# Patient Record
Sex: FEMALE | Race: BLACK OR AFRICAN AMERICAN | NOT HISPANIC OR LATINO | Employment: FULL TIME | URBAN - METROPOLITAN AREA
[De-identification: names, ages, dates, MRNs, and addresses within clinical notes are randomized per-mention and may not be internally consistent; named-entity substitution may affect disease eponyms.]

---

## 2018-10-08 PROBLEM — K59.04 CHRONIC IDIOPATHIC CONSTIPATION: Status: ACTIVE | Noted: 2018-10-08

## 2018-11-05 PROBLEM — L30.9 ECZEMA: Status: ACTIVE | Noted: 2018-11-05

## 2019-06-26 PROBLEM — R61 HYPERHIDROSIS: Status: ACTIVE | Noted: 2019-06-26

## 2019-11-04 PROBLEM — K59.04 CHRONIC IDIOPATHIC CONSTIPATION: Chronic | Status: ACTIVE | Noted: 2018-10-08

## 2019-11-04 PROBLEM — L20.84 INTRINSIC ECZEMA: Status: ACTIVE | Noted: 2018-11-05

## 2019-11-25 PROBLEM — R76.8 POSITIVE ANA (ANTINUCLEAR ANTIBODY): Status: ACTIVE | Noted: 2019-11-25

## 2019-11-25 PROBLEM — Z20.828 EBV EXPOSURE: Status: ACTIVE | Noted: 2019-11-25

## 2019-12-10 PROBLEM — R59.0 CERVICAL ADENOPATHY: Status: ACTIVE | Noted: 2019-12-10

## 2020-04-19 PROBLEM — Z20.828 EBV EXPOSURE: Chronic | Status: ACTIVE | Noted: 2019-11-25

## 2020-04-19 PROBLEM — R76.8 POSITIVE ANA (ANTINUCLEAR ANTIBODY): Chronic | Status: ACTIVE | Noted: 2019-11-25

## 2020-10-26 PROBLEM — S13.4XXA WHIPLASH INJURIES, INITIAL ENCOUNTER: Status: ACTIVE | Noted: 2020-10-26

## 2020-12-13 PROBLEM — S13.4XXS WHIPLASH INJURIES, SEQUELA: Status: ACTIVE | Noted: 2020-10-26

## 2021-03-17 PROBLEM — R59.0 CERVICAL ADENOPATHY: Chronic | Status: ACTIVE | Noted: 2019-12-10

## 2021-05-04 ENCOUNTER — PATIENT MESSAGE (OUTPATIENT)
Dept: RESEARCH | Facility: HOSPITAL | Age: 51
End: 2021-05-04

## 2021-06-16 ENCOUNTER — TELEPHONE (OUTPATIENT)
Dept: SURGERY | Facility: CLINIC | Age: 51
End: 2021-06-16

## 2021-06-16 ENCOUNTER — PATIENT MESSAGE (OUTPATIENT)
Dept: SURGERY | Facility: CLINIC | Age: 51
End: 2021-06-16

## 2021-06-23 ENCOUNTER — TELEPHONE (OUTPATIENT)
Dept: SURGERY | Facility: CLINIC | Age: 51
End: 2021-06-23

## 2021-08-17 ENCOUNTER — TELEPHONE (OUTPATIENT)
Dept: SURGERY | Facility: CLINIC | Age: 51
End: 2021-08-17

## 2021-08-19 ENCOUNTER — TELEPHONE (OUTPATIENT)
Dept: SURGERY | Facility: CLINIC | Age: 51
End: 2021-08-19

## 2021-08-19 ENCOUNTER — OFFICE VISIT (OUTPATIENT)
Dept: SURGERY | Facility: CLINIC | Age: 51
End: 2021-08-19
Payer: COMMERCIAL

## 2021-08-19 VITALS
DIASTOLIC BLOOD PRESSURE: 78 MMHG | WEIGHT: 169.75 LBS | SYSTOLIC BLOOD PRESSURE: 122 MMHG | HEART RATE: 94 BPM | BODY MASS INDEX: 28.98 KG/M2 | HEIGHT: 64 IN | OXYGEN SATURATION: 99 %

## 2021-08-19 DIAGNOSIS — N81.6 RECTOCELE: ICD-10-CM

## 2021-08-19 DIAGNOSIS — Z80.0 FAMILY HISTORY OF COLON CANCER: ICD-10-CM

## 2021-08-19 DIAGNOSIS — K59.00 CONSTIPATION, UNSPECIFIED CONSTIPATION TYPE: Primary | ICD-10-CM

## 2021-08-19 PROCEDURE — 46600 DIAGNOSTIC ANOSCOPY SPX: CPT | Mod: S$GLB,,, | Performed by: COLON & RECTAL SURGERY

## 2021-08-19 PROCEDURE — 46600 PR DIAG2STIC A2SCOPY: ICD-10-PCS | Mod: S$GLB,,, | Performed by: COLON & RECTAL SURGERY

## 2021-08-19 PROCEDURE — 99203 PR OFFICE/OUTPT VISIT, NEW, LEVL III, 30-44 MIN: ICD-10-PCS | Mod: 25,S$GLB,, | Performed by: COLON & RECTAL SURGERY

## 2021-08-19 PROCEDURE — 99203 OFFICE O/P NEW LOW 30 MIN: CPT | Mod: 25,S$GLB,, | Performed by: COLON & RECTAL SURGERY

## 2021-08-19 PROCEDURE — 99999 PR PBB SHADOW E&M-EST. PATIENT-LVL IV: CPT | Mod: PBBFAC,,, | Performed by: COLON & RECTAL SURGERY

## 2021-08-19 PROCEDURE — 99999 PR PBB SHADOW E&M-EST. PATIENT-LVL IV: ICD-10-PCS | Mod: PBBFAC,,, | Performed by: COLON & RECTAL SURGERY

## 2021-08-19 RX ORDER — HYDROCODONE BITARTRATE AND ACETAMINOPHEN 10; 325 MG/1; MG/1
TABLET ORAL
COMMUNITY
Start: 2021-07-23 | End: 2021-08-19

## 2021-08-19 RX ORDER — CYCLOBENZAPRINE HCL 10 MG
10 TABLET ORAL 3 TIMES DAILY
COMMUNITY
Start: 2021-04-19 | End: 2021-08-19

## 2021-08-19 RX ORDER — MELOXICAM 15 MG/1
15 TABLET ORAL DAILY
COMMUNITY
Start: 2021-04-19 | End: 2021-08-19

## 2021-08-19 RX ORDER — GABAPENTIN 300 MG/1
300 CAPSULE ORAL 3 TIMES DAILY
COMMUNITY
Start: 2021-04-19 | End: 2021-08-19

## 2021-08-19 RX ORDER — GLYCOPYRROLATE 1 MG/1
TABLET ORAL
COMMUNITY
Start: 2021-04-14 | End: 2021-08-19

## 2021-08-19 RX ORDER — FLUCONAZOLE 150 MG/1
TABLET ORAL
COMMUNITY
Start: 2021-04-13 | End: 2021-08-19

## 2021-08-19 RX ORDER — METFORMIN HYDROCHLORIDE 500 MG/1
500 TABLET, EXTENDED RELEASE ORAL NIGHTLY
COMMUNITY
Start: 2021-06-21 | End: 2023-04-13

## 2021-08-23 ENCOUNTER — HOSPITAL ENCOUNTER (OUTPATIENT)
Dept: RADIOLOGY | Facility: HOSPITAL | Age: 51
Discharge: HOME | End: 2021-08-23
Attending: COLON & RECTAL SURGERY
Payer: COMMERCIAL

## 2021-08-23 DIAGNOSIS — K59.00 CONSTIPATION, UNSPECIFIED CONSTIPATION TYPE: ICD-10-CM

## 2021-08-23 PROCEDURE — 74018 RADEX ABDOMEN 1 VIEW: CPT | Mod: 26,,, | Performed by: RADIOLOGY

## 2021-08-23 PROCEDURE — 74018 XR ABDOMEN AP 1 VIEW: ICD-10-PCS | Mod: 26,,, | Performed by: RADIOLOGY

## 2021-08-23 PROCEDURE — 74018 RADEX ABDOMEN 1 VIEW: CPT | Mod: TC

## 2021-08-25 ENCOUNTER — HOSPITAL ENCOUNTER (OUTPATIENT)
Dept: RADIOLOGY | Facility: HOSPITAL | Age: 51
Discharge: HOME | End: 2021-08-25
Attending: COLON & RECTAL SURGERY
Payer: COMMERCIAL

## 2021-08-25 DIAGNOSIS — K59.00 CONSTIPATION, UNSPECIFIED CONSTIPATION TYPE: ICD-10-CM

## 2021-08-25 PROCEDURE — 74018 RADEX ABDOMEN 1 VIEW: CPT | Mod: 26,,, | Performed by: RADIOLOGY

## 2021-08-25 PROCEDURE — 74018 XR ABDOMEN AP 1 VIEW: ICD-10-PCS | Mod: 26,,, | Performed by: RADIOLOGY

## 2021-08-25 PROCEDURE — 74018 RADEX ABDOMEN 1 VIEW: CPT | Mod: TC

## 2021-08-27 ENCOUNTER — HOSPITAL ENCOUNTER (OUTPATIENT)
Dept: RADIOLOGY | Facility: HOSPITAL | Age: 51
Discharge: HOME | End: 2021-08-27
Attending: COLON & RECTAL SURGERY
Payer: COMMERCIAL

## 2021-08-27 DIAGNOSIS — K59.00 CONSTIPATION, UNSPECIFIED CONSTIPATION TYPE: ICD-10-CM

## 2021-08-27 PROCEDURE — 74018 RADEX ABDOMEN 1 VIEW: CPT | Mod: 26,,, | Performed by: RADIOLOGY

## 2021-08-27 PROCEDURE — 74018 XR ABDOMEN AP 1 VIEW: ICD-10-PCS | Mod: 26,,, | Performed by: RADIOLOGY

## 2021-08-27 PROCEDURE — 74018 RADEX ABDOMEN 1 VIEW: CPT | Mod: TC

## 2021-09-17 ENCOUNTER — PATIENT MESSAGE (OUTPATIENT)
Dept: SURGERY | Facility: CLINIC | Age: 51
End: 2021-09-17

## 2021-10-12 PROBLEM — Z51.81 THERAPEUTIC DRUG MONITORING: Status: ACTIVE | Noted: 2021-10-12

## 2021-10-12 PROBLEM — Z71.3 DIETARY COUNSELING: Chronic | Status: ACTIVE | Noted: 2021-10-12

## 2021-10-12 PROBLEM — E66.3 OVERWEIGHT WITH BODY MASS INDEX (BMI) OF 29 TO 29.9 IN ADULT: Chronic | Status: ACTIVE | Noted: 2021-10-12

## 2021-11-12 ENCOUNTER — ANESTHESIA EVENT (OUTPATIENT)
Dept: ENDOSCOPY | Facility: HOSPITAL | Age: 51
End: 2021-11-12
Payer: COMMERCIAL

## 2021-11-29 ENCOUNTER — HOSPITAL ENCOUNTER (OUTPATIENT)
Dept: RADIOLOGY | Facility: HOSPITAL | Age: 51
Discharge: HOME | End: 2021-11-29
Attending: COLON & RECTAL SURGERY
Payer: COMMERCIAL

## 2021-11-29 DIAGNOSIS — K59.00 CONSTIPATION, UNSPECIFIED CONSTIPATION TYPE: ICD-10-CM

## 2021-11-29 DIAGNOSIS — N81.6 RECTOCELE: ICD-10-CM

## 2021-11-29 PROCEDURE — 74270 FL DEFECOGRAM: ICD-10-PCS | Mod: 26,,, | Performed by: STUDENT IN AN ORGANIZED HEALTH CARE EDUCATION/TRAINING PROGRAM

## 2021-11-29 PROCEDURE — 25500020 PHARM REV CODE 255: Performed by: COLON & RECTAL SURGERY

## 2021-11-29 PROCEDURE — 74270 X-RAY XM COLON 1CNTRST STD: CPT | Mod: 26,,, | Performed by: STUDENT IN AN ORGANIZED HEALTH CARE EDUCATION/TRAINING PROGRAM

## 2021-11-29 PROCEDURE — A9698 NON-RAD CONTRAST MATERIALNOC: HCPCS | Performed by: COLON & RECTAL SURGERY

## 2021-11-29 PROCEDURE — 74270 X-RAY XM COLON 1CNTRST STD: CPT | Mod: TC

## 2021-11-29 RX ADMIN — BARIUM SULFATE 454 G: 0.6 CREAM ORAL at 10:11

## 2021-11-29 RX ADMIN — BARIUM SULFATE 60 ML: 0.6 SUSPENSION ORAL at 10:11

## 2021-12-10 ENCOUNTER — TELEPHONE (OUTPATIENT)
Dept: ENDOSCOPY | Facility: HOSPITAL | Age: 51
End: 2021-12-10
Payer: COMMERCIAL

## 2021-12-13 ENCOUNTER — HOSPITAL ENCOUNTER (OUTPATIENT)
Dept: PREADMISSION TESTING | Facility: HOSPITAL | Age: 51
Discharge: HOME | End: 2021-12-13
Attending: COLON & RECTAL SURGERY
Payer: COMMERCIAL

## 2021-12-13 DIAGNOSIS — Z01.818 PRE-OP TESTING: ICD-10-CM

## 2021-12-13 PROCEDURE — U0003 INFECTIOUS AGENT DETECTION BY NUCLEIC ACID (DNA OR RNA); SEVERE ACUTE RESPIRATORY SYNDROME CORONAVIRUS 2 (SARS-COV-2) (CORONAVIRUS DISEASE [COVID-19]), AMPLIFIED PROBE TECHNIQUE, MAKING USE OF HIGH THROUGHPUT TECHNOLOGIES AS DESCRIBED BY CMS-2020-01-R: HCPCS | Performed by: COLON & RECTAL SURGERY

## 2021-12-13 PROCEDURE — U0005 INFEC AGEN DETEC AMPLI PROBE: HCPCS | Performed by: COLON & RECTAL SURGERY

## 2021-12-14 LAB
SARS-COV-2 RNA RESP QL NAA+PROBE: NOT DETECTED
SARS-COV-2- CYCLE NUMBER: NORMAL

## 2021-12-16 ENCOUNTER — ANESTHESIA (OUTPATIENT)
Dept: ENDOSCOPY | Facility: HOSPITAL | Age: 51
End: 2021-12-16
Payer: COMMERCIAL

## 2021-12-16 ENCOUNTER — HOSPITAL ENCOUNTER (OUTPATIENT)
Facility: HOSPITAL | Age: 51
Discharge: HOME | End: 2021-12-16
Attending: COLON & RECTAL SURGERY | Admitting: COLON & RECTAL SURGERY
Payer: COMMERCIAL

## 2021-12-16 ENCOUNTER — TELEPHONE (OUTPATIENT)
Dept: SURGERY | Facility: CLINIC | Age: 51
End: 2021-12-16
Payer: COMMERCIAL

## 2021-12-16 VITALS
DIASTOLIC BLOOD PRESSURE: 80 MMHG | TEMPERATURE: 97 F | HEART RATE: 98 BPM | SYSTOLIC BLOOD PRESSURE: 134 MMHG | OXYGEN SATURATION: 99 % | RESPIRATION RATE: 16 BRPM

## 2021-12-16 DIAGNOSIS — Z12.11 COLON CANCER SCREENING: ICD-10-CM

## 2021-12-16 DIAGNOSIS — K59.04 CHRONIC IDIOPATHIC CONSTIPATION: Primary | Chronic | ICD-10-CM

## 2021-12-16 DIAGNOSIS — K59.02 CONSTIPATION DUE TO OUTLET DYSFUNCTION: Primary | ICD-10-CM

## 2021-12-16 LAB — GLUCOSE SERPL-MCNC: 129 MG/DL (ref 70–110)

## 2021-12-16 PROCEDURE — 25000003 PHARM REV CODE 250: Performed by: NURSE ANESTHETIST, CERTIFIED REGISTERED

## 2021-12-16 PROCEDURE — 37000009 HC ANESTHESIA EA ADD 15 MINS: Performed by: COLON & RECTAL SURGERY

## 2021-12-16 PROCEDURE — 00811 ANES LWR INTST NDSC NOS: CPT | Mod: QZ | Performed by: NURSE ANESTHETIST, CERTIFIED REGISTERED

## 2021-12-16 PROCEDURE — 63600175 PHARM REV CODE 636 W HCPCS: Performed by: NURSE ANESTHETIST, CERTIFIED REGISTERED

## 2021-12-16 PROCEDURE — 45378 DIAGNOSTIC COLONOSCOPY: CPT | Mod: ,,, | Performed by: COLON & RECTAL SURGERY

## 2021-12-16 PROCEDURE — 45378 PR COLONOSCOPY,DIAGNOSTIC: ICD-10-PCS | Mod: ,,, | Performed by: COLON & RECTAL SURGERY

## 2021-12-16 PROCEDURE — 37000008 HC ANESTHESIA 1ST 15 MINUTES: Performed by: COLON & RECTAL SURGERY

## 2021-12-16 PROCEDURE — 45378 DIAGNOSTIC COLONOSCOPY: CPT | Performed by: COLON & RECTAL SURGERY

## 2021-12-16 PROCEDURE — 82962 GLUCOSE BLOOD TEST: CPT | Performed by: COLON & RECTAL SURGERY

## 2021-12-16 RX ORDER — METOPROLOL TARTRATE 1 MG/ML
INJECTION, SOLUTION INTRAVENOUS
Status: DISCONTINUED | OUTPATIENT
Start: 2021-12-16 | End: 2021-12-16

## 2021-12-16 RX ORDER — PROPOFOL 10 MG/ML
VIAL (ML) INTRAVENOUS
Status: DISCONTINUED | OUTPATIENT
Start: 2021-12-16 | End: 2021-12-16

## 2021-12-16 RX ORDER — SODIUM CHLORIDE, SODIUM LACTATE, POTASSIUM CHLORIDE, CALCIUM CHLORIDE 600; 310; 30; 20 MG/100ML; MG/100ML; MG/100ML; MG/100ML
INJECTION, SOLUTION INTRAVENOUS CONTINUOUS
Status: DISCONTINUED | OUTPATIENT
Start: 2021-12-16 | End: 2021-12-16 | Stop reason: HOSPADM

## 2021-12-16 RX ORDER — LIDOCAINE HYDROCHLORIDE 20 MG/ML
INJECTION, SOLUTION EPIDURAL; INFILTRATION; INTRACAUDAL; PERINEURAL
Status: DISCONTINUED | OUTPATIENT
Start: 2021-12-16 | End: 2021-12-16

## 2021-12-16 RX ADMIN — PROPOFOL 50 MG: 10 INJECTION, EMULSION INTRAVENOUS at 08:12

## 2021-12-16 RX ADMIN — METOPROLOL TARTRATE 2.5 MG: 5 INJECTION, SOLUTION INTRAVENOUS at 09:12

## 2021-12-16 RX ADMIN — PROPOFOL 120 MG: 10 INJECTION, EMULSION INTRAVENOUS at 08:12

## 2021-12-16 RX ADMIN — LIDOCAINE HYDROCHLORIDE 50 MG: 20 INJECTION, SOLUTION EPIDURAL; INFILTRATION; INTRACAUDAL; PERINEURAL at 08:12

## 2022-03-04 PROBLEM — Z51.81 THERAPEUTIC DRUG MONITORING: Chronic | Status: ACTIVE | Noted: 2021-10-12

## 2022-07-14 PROBLEM — Z71.3 DIETARY COUNSELING: Chronic | Status: RESOLVED | Noted: 2021-10-12 | Resolved: 2022-07-14

## 2022-07-14 PROBLEM — Z51.81 THERAPEUTIC DRUG MONITORING: Chronic | Status: RESOLVED | Noted: 2021-10-12 | Resolved: 2022-07-14

## 2022-08-22 ENCOUNTER — TELEPHONE (OUTPATIENT)
Dept: SURGERY | Facility: CLINIC | Age: 52
End: 2022-08-22
Payer: COMMERCIAL

## 2022-09-29 ENCOUNTER — OFFICE VISIT (OUTPATIENT)
Dept: SURGERY | Facility: CLINIC | Age: 52
End: 2022-09-29
Payer: COMMERCIAL

## 2022-09-29 VITALS
WEIGHT: 160.94 LBS | HEART RATE: 86 BPM | BODY MASS INDEX: 27.48 KG/M2 | SYSTOLIC BLOOD PRESSURE: 157 MMHG | HEIGHT: 64 IN | DIASTOLIC BLOOD PRESSURE: 67 MMHG

## 2022-09-29 DIAGNOSIS — K59.00 CONSTIPATION, UNSPECIFIED CONSTIPATION TYPE: Primary | ICD-10-CM

## 2022-09-29 PROCEDURE — 99213 OFFICE O/P EST LOW 20 MIN: CPT | Mod: S$GLB,,, | Performed by: COLON & RECTAL SURGERY

## 2022-09-29 PROCEDURE — 4010F ACE/ARB THERAPY RXD/TAKEN: CPT | Mod: CPTII,S$GLB,, | Performed by: COLON & RECTAL SURGERY

## 2022-09-29 PROCEDURE — 4010F PR ACE/ARB THEARPY RXD/TAKEN: ICD-10-PCS | Mod: CPTII,S$GLB,, | Performed by: COLON & RECTAL SURGERY

## 2022-09-29 PROCEDURE — 3078F DIAST BP <80 MM HG: CPT | Mod: CPTII,S$GLB,, | Performed by: COLON & RECTAL SURGERY

## 2022-09-29 PROCEDURE — 1159F MED LIST DOCD IN RCRD: CPT | Mod: CPTII,S$GLB,, | Performed by: COLON & RECTAL SURGERY

## 2022-09-29 PROCEDURE — 3077F PR MOST RECENT SYSTOLIC BLOOD PRESSURE >= 140 MM HG: ICD-10-PCS | Mod: CPTII,S$GLB,, | Performed by: COLON & RECTAL SURGERY

## 2022-09-29 PROCEDURE — 99999 PR PBB SHADOW E&M-EST. PATIENT-LVL IV: CPT | Mod: PBBFAC,,, | Performed by: COLON & RECTAL SURGERY

## 2022-09-29 PROCEDURE — 1159F PR MEDICATION LIST DOCUMENTED IN MEDICAL RECORD: ICD-10-PCS | Mod: CPTII,S$GLB,, | Performed by: COLON & RECTAL SURGERY

## 2022-09-29 PROCEDURE — 3008F BODY MASS INDEX DOCD: CPT | Mod: CPTII,S$GLB,, | Performed by: COLON & RECTAL SURGERY

## 2022-09-29 PROCEDURE — 1160F RVW MEDS BY RX/DR IN RCRD: CPT | Mod: CPTII,S$GLB,, | Performed by: COLON & RECTAL SURGERY

## 2022-09-29 PROCEDURE — 3077F SYST BP >= 140 MM HG: CPT | Mod: CPTII,S$GLB,, | Performed by: COLON & RECTAL SURGERY

## 2022-09-29 PROCEDURE — 99999 PR PBB SHADOW E&M-EST. PATIENT-LVL IV: ICD-10-PCS | Mod: PBBFAC,,, | Performed by: COLON & RECTAL SURGERY

## 2022-09-29 PROCEDURE — 1160F PR REVIEW ALL MEDS BY PRESCRIBER/CLIN PHARMACIST DOCUMENTED: ICD-10-PCS | Mod: CPTII,S$GLB,, | Performed by: COLON & RECTAL SURGERY

## 2022-09-29 PROCEDURE — 3008F PR BODY MASS INDEX (BMI) DOCUMENTED: ICD-10-PCS | Mod: CPTII,S$GLB,, | Performed by: COLON & RECTAL SURGERY

## 2022-09-29 PROCEDURE — 3078F PR MOST RECENT DIASTOLIC BLOOD PRESSURE < 80 MM HG: ICD-10-PCS | Mod: CPTII,S$GLB,, | Performed by: COLON & RECTAL SURGERY

## 2022-09-29 PROCEDURE — 99213 PR OFFICE/OUTPT VISIT, EST, LEVL III, 20-29 MIN: ICD-10-PCS | Mod: S$GLB,,, | Performed by: COLON & RECTAL SURGERY

## 2022-09-29 NOTE — PROGRESS NOTES
"Subjective:       Patient ID: Mitzy Quezada is a 52 y.o. female.    Chief Complaint: Constipation    HPI  53 yo F initially seen by me 23 for evaluation of refractory constipation. She has had trouble with constipation since her teen years.   Has tried Linzess, Amitiza, Trulance, Movantik - all have worked for about a month and stopped.  Recently started Motegrity - is having daily BM's but still c/o severe pain on left side of abdomen/left flank, radiating to RUQ, as well as reflux/GERD. Also states that her stools are "pencil-thin."    CT A/P 2020 - (Images personally reviewed.)  -Mild intra and extrahepatic biliary dilatation, possibly from prior cholecystectomy/chronic.  If there is concern for biliary obstruction, MRCP would be recommended.   -Approximate 3 cm left ovarian cyst.  -Large amount of stool in distal colon    Last colonoscopy -  - [facility] - normal per patient    + family hx of CRC - maternal uncle  age 42 from CRC, mother has had polyps.    I ordered additional testing at that time:    Sitz Marker study Aug 2021 - only two markers remain on day 5, appeared to be in the ascending colon    21 Defecography:  The anal canal opens with defecation.  No significant rectocele.  There was mild residual after evaluation.  Probable pelvic floor dysfunction with poor relaxation of puborectalis sling.   The anorectal angle measures 83 degrees with lifting, 92 degrees in neutral position, and 87 degrees with straining noting suboptimal true lateral view.  No significant rectocele.    I performed repeat Colonoscopy 2021:  Tortuous, redundant colon, but otherwise normal    Manometry ordered but never got done.    She was referred for biofeedback/pelvic floor PT.    She returns today - has had no improvement with pelvic floor PT.  She c/o chronic abdominal pain, radiating to left flank, and well as bloating.  She is now taking Linzess - doesn't take it regularly but when she does " take it better she has several loose BM's/day and feels better but still has a sense of incomplete evacuation.        Review of patient's allergies indicates:  No Known Allergies    Past Medical History:   Diagnosis Date    Diabetes mellitus, type 2     Hyperlipidemia     Hypertension        Past Surgical History:   Procedure Laterality Date    CHOLECYSTECTOMY      COLONOSCOPY      COLONOSCOPY N/A 12/16/2021    Procedure: COLONOSCOPY;  Surgeon: Zackary Rodríguez MD;  Location: [facility];  Service: Colon and Rectal;  Laterality: N/A;    DILATION AND CURETTAGE OF UTERUS      HYSTERECTOMY      TONSILLECTOMY      TUBAL LIGATION         Current Outpatient Medications   Medication Sig Dispense Refill    atorvastatin (LIPITOR) 40 MG tablet Take 40 mg by mouth once daily.      betamethasone valerate 0.1% (VALISONE) 0.1 % Oint APPLY BEHIND EARS AND ON RIGHT CHEST TWO TIMES A DAY AS DIRECTED. DO NOT USE ON FACE      crisaborole (EUCRISA) 2 % Oint Apply 1 application topically 2 (two) times daily. 60 g 1    estradiol 0.05 mg/24 hr td ptsw (VIVELLE-DOT) 0.05 mg/24 hr Place 1 patch onto the skin twice a week.      hydrocortisone 2.5 % ointment APPLY TO FACE AND EYELIDS TWO TIMES A DAY AS DIRECTED.      linaCLOtide (LINZESS) 145 mcg Cap capsule Take 1 capsule (145 mcg total) by mouth before breakfast. 90 capsule 3    losartan (COZAAR) 50 MG tablet Take 50 mg by mouth once daily.      metFORMIN (GLUCOPHAGE-XR) 500 MG ER 24hr tablet Take 500 mg by mouth every evening.      mupirocin (BACTROBAN) 2 % ointment Apply topically 2 (two) times daily.      promethazine (PHENERGAN) 25 MG tablet Take 1 tablet (25 mg total) by mouth 2 (two) times daily as needed for Nausea. 30 tablet 0    traZODone (DESYREL) 50 MG tablet Take 1 tablet (50 mg total) by mouth nightly as needed for Insomnia. 30 tablet 5    TRULICITY 4.5 mg/0.5 mL pen injector INJECT 4.5 MG (0.5 ML) SUBCUTANEOUSLY ONCE A WEEK AS DIRECTED      FLUoxetine 10 MG capsule Take 1 capsule  (10 mg total) by mouth once daily. 30 capsule 1    NEILMED SINUS RINSE COMPLETE pkdv use as directed      pantoprazole (PROTONIX) 40 MG tablet Take 1 tablet (40 mg total) by mouth 2 (two) times daily before meals. (Patient not taking: Reported on 2022) 180 tablet 3     No current facility-administered medications for this visit.       Family History   Problem Relation Age of Onset    Heart disease Mother     Stroke Mother     Diabetes Mother     Breast cancer Mother     Diabetes Sister     Hypertension Sister     Diabetes Brother     Hypertension Brother     Diabetes Maternal Aunt     Hypertension Maternal Aunt     Diabetes Maternal Uncle     Hypertension Maternal Uncle     Colon cancer Maternal Uncle          at 41 yo, dx 41    Diabetes Paternal Aunt     Hypertension Paternal Aunt     Breast cancer Paternal Aunt         late 50s dx    Diabetes Paternal Uncle     Hypertension Paternal Uncle     Lung cancer Maternal Grandmother         tobacco user    Esophageal cancer Maternal Grandfather         tobacco user       Social History     Socioeconomic History    Marital status: Single   Tobacco Use    Smoking status: Never    Smokeless tobacco: Never   Substance and Sexual Activity    Alcohol use: No    Drug use: No    Sexual activity: Not Currently       Review of Systems   Constitutional:  Negative for chills and fever.   HENT:  Negative for congestion and sore throat.    Eyes:  Negative for visual disturbance.   Respiratory:  Negative for cough and shortness of breath.    Cardiovascular:  Negative for chest pain and palpitations.   Gastrointestinal:  Positive for abdominal distention, abdominal pain, constipation and nausea. Negative for anal bleeding, blood in stool, diarrhea, rectal pain and vomiting.   Endocrine: Negative for cold intolerance and heat intolerance.   Genitourinary:  Negative for dysuria and frequency.   Musculoskeletal:  Negative for arthralgias, back pain and neck pain.   Skin:  Negative  for rash.   Allergic/Immunologic: Negative for immunocompromised state.   Neurological:  Negative for dizziness, light-headedness and headaches.   Hematological:  Does not bruise/bleed easily.   Psychiatric/Behavioral:  Negative for confusion. The patient is not nervous/anxious.      Objective:      Physical Exam  Constitutional:       Appearance: She is well-developed.   HENT:      Head: Normocephalic.   Pulmonary:      Effort: Pulmonary effort is normal. No respiratory distress.   Abdominal:      General: Bowel sounds are normal. There is no distension.      Palpations: Abdomen is soft. There is no mass.      Tenderness: There is abdominal tenderness (Mild, diffuse). There is no guarding or rebound.   Genitourinary:     Comments:     Musculoskeletal:         General: Normal range of motion.   Skin:     General: Skin is warm and dry.   Neurological:      Mental Status: She is alert and oriented to person, place, and time.         Lab Results   Component Value Date    WBC 4.50 06/30/2022    HGB 12.9 06/30/2022    HCT 39.6 06/30/2022    MCV 87 06/30/2022     06/30/2022     BMP  Lab Results   Component Value Date     06/30/2022    K 3.8 06/30/2022     06/30/2022    CO2 32 (H) 06/30/2022    BUN 9 06/30/2022    CREATININE 0.66 (L) 06/30/2022    CALCIUM 8.7 06/30/2022    ANIONGAP 3 (L) 06/30/2022    ESTGFRAFRICA >60 06/30/2022    EGFRNONAA >60 06/30/2022     CMP  Sodium   Date Value Ref Range Status   06/30/2022 136 136 - 145 mmol/L Final     Potassium   Date Value Ref Range Status   06/30/2022 3.8 3.5 - 5.1 mmol/L Final     Chloride   Date Value Ref Range Status   06/30/2022 101 95 - 110 mmol/L Final     CO2   Date Value Ref Range Status   06/30/2022 32 (H) 23 - 29 mmol/L Final     Glucose   Date Value Ref Range Status   06/30/2022 99 74 - 106 mg/dL Final     BUN   Date Value Ref Range Status   06/30/2022 9 7 - 17 mg/dL Final     Creatinine   Date Value Ref Range Status   06/30/2022 0.66 (L) 0.70 -  1.20 mg/dL Final     Calcium   Date Value Ref Range Status   06/30/2022 8.7 8.4 - 10.2 mg/dL Final     Total Protein   Date Value Ref Range Status   06/30/2022 7.1 6.3 - 8.2 g/dL Final     Albumin   Date Value Ref Range Status   06/30/2022 4.2 3.5 - 5.2 g/dL Final     Total Bilirubin   Date Value Ref Range Status   06/30/2022 0.5 0.2 - 1.3 mg/dL Final     Alkaline Phosphatase   Date Value Ref Range Status   06/30/2022 61 38 - 145 U/L Final     AST   Date Value Ref Range Status   06/30/2022 34 14 - 36 U/L Final     ALT   Date Value Ref Range Status   06/30/2022 22 10 - 44 U/L Final     Anion Gap   Date Value Ref Range Status   06/30/2022 3 (L) 8 - 16 mmol/L Final     eGFR if    Date Value Ref Range Status   06/30/2022 >60 >60 mL/min/1.73 m^2 Final     eGFR if non    Date Value Ref Range Status   06/30/2022 >60 >60 mL/min/1.73 m^2 Final     Comment:     Calculation used to obtain the estimated glomerular filtration  rate (eGFR) is the CKD-EPI equation.        No results found for: CEA        Assessment:       1. Constipation, unspecified constipation type          Plan:   She seems to be doing okay when she takes Linzess, but does not take it regularly because she is concerned about the frequency of bowel movements when she does take it.  I encouraged her to try taking it more regularly.    She is concerned because of the ongoing constipation and the fact that she was noted to have a very redundant colon on colonoscopy.  Her Sitz marker study last year did show clearance of all but 2 of the markers on day 5.  She would like to repeat the Sitz marker study now to see if there has been any interval change, which I think is reasonable.  We will also obtain anorectal manometry to determine whether or not her anorectal function is normal.  If she has normal anorectal function and has signs of slow transit constipation on her Sitz marker study, she may be a candidate for a total abdominal  colectomy with ileorectal anastomosis.  If she has abnormal anorectal function, I would encourage continued medical management of her constipation.    Zackary Rodríguez MD, FACS, FASCRS  Senior Staff Surgeon  Department of Colon & Rectal Surgery     This note was created using voice recognition software, and may contain some unrecognized transcriptional errors.

## 2022-09-30 DIAGNOSIS — K59.00 CONSTIPATION, UNSPECIFIED CONSTIPATION TYPE: Primary | ICD-10-CM

## 2022-10-25 ENCOUNTER — TELEPHONE (OUTPATIENT)
Dept: ENDOSCOPY | Facility: HOSPITAL | Age: 52
End: 2022-10-25
Payer: COMMERCIAL

## 2022-10-25 ENCOUNTER — PATIENT MESSAGE (OUTPATIENT)
Dept: ENDOSCOPY | Facility: HOSPITAL | Age: 52
End: 2022-10-25
Payer: COMMERCIAL

## 2022-10-25 NOTE — TELEPHONE ENCOUNTER
Pt calling to rescheduled her manometry test   Pt states she had a conflict with her work scheduled   Please call

## 2022-11-09 ENCOUNTER — TELEPHONE (OUTPATIENT)
Dept: ENDOSCOPY | Facility: HOSPITAL | Age: 52
End: 2022-11-09
Payer: COMMERCIAL

## 2023-05-03 PROBLEM — E66.3 OVERWEIGHT WITH BODY MASS INDEX (BMI) OF 26 TO 26.9 IN ADULT: Chronic | Status: RESOLVED | Noted: 2021-10-12 | Resolved: 2023-05-03

## 2024-04-22 NOTE — TELEPHONE ENCOUNTER
Pt called to cxl her procedure for 11/10  I'm not seeing pt scheduled   Can someone check behind me and cxl the procedure   Pt states she will call back to reschedule   Thanks   
22-Apr-2024 21:49